# Patient Record
Sex: MALE | Race: WHITE | URBAN - METROPOLITAN AREA
[De-identification: names, ages, dates, MRNs, and addresses within clinical notes are randomized per-mention and may not be internally consistent; named-entity substitution may affect disease eponyms.]

---

## 2023-05-10 ENCOUNTER — OFFICE VISIT (OUTPATIENT)
Dept: URBAN - METROPOLITAN AREA CLINIC 91 | Facility: CLINIC | Age: 63
End: 2023-05-10

## 2023-05-10 DIAGNOSIS — H25.13 AGE-RELATED NUCLEAR CATARACT, BILATERAL: Primary | ICD-10-CM

## 2023-05-10 PROCEDURE — 99204 OFFICE O/P NEW MOD 45 MIN: CPT | Performed by: SPECIALIST

## 2023-05-10 ASSESSMENT — VISUAL ACUITY: OS: 20/20

## 2023-05-10 ASSESSMENT — INTRAOCULAR PRESSURE
OD: 14
OS: 12

## 2023-05-10 NOTE — IMPRESSION/PLAN
Impression: Age-related nuclear cataract, bilateral: H25.13. Patient understands that due to co-morbid ocular condition due to hx of trauma OD, the visual prognosis after cataract surgery may be limited and vision may be significantly reduced. All pt's questions and concerns were addressed. Plan: Due to the fact that cataracts are interfering with patient's daily activities, cataract surgery was discussed as an option to improve patient's vision. I have discussed all risks and benefits associated with surgery. Patient understands that the need for cataract surgery is NOT urgent, and that surgery may be delayed if they wish. I discussed the different intra-ocular lens options available including standard monofocal IOL, PanOptix, Toric, Vivity, Multifocal. I have explained the option of patient proceeding with standard cataract surgery vs LenSx and astigmatism management. I stressed possible side effects such as halos and glare, need for glasses, contacts and further surgery such as laser vision correction or IOL exchange. I answered all patient's questions, and addressed any concerns patient may have. Patient wishes to schedule appointment for pre-operative exam at this time. Patient wishes to proceed with CE IOL OD. Patient elects standard OD.

## 2023-05-10 NOTE — IMPRESSION/PLAN
Impression: Age-related nuclear cataract, bilateral: H25.13. Plan: Due to the fact that cataract is not affecting patient's vision in left eye, I would not recommend surgical intervention at this time. Patient was advised to consider using UVB sunglasses when outdoors. We will consider cataract surgery at later time if patient's visual function no longer meets their needs or interferes with their daily activities.